# Patient Record
(demographics unavailable — no encounter records)

---

## 2024-11-06 NOTE — HISTORY OF PRESENT ILLNESS
[Never] : never [TextBox_4] : 69-year-old female with history of mild OAD presents for follow-up.  Patient continues to complain of occasional productive cough without fever, chills, chest pain, hemoptysis, night sweats or weight loss.  She stopped use of Trelegy 1 month ago as prescribed by me last visit because of mouth discomfort.  She continues to use albuterol on an as-needed basis alone. [TextBox_29] : Denies snoring, daytime somnolence, apneic episodes, AM headaches

## 2024-11-11 NOTE — ASSESSMENT
[FreeTextEntry1] : 68 y/o F DsDNA ab positivity  =dx w Sjogren in early 2000s.  =SICCA symptoms =only on topicals, as cholinergic agonist contraindicated per cardiologist.  =labs reviewed from 4/24 [facility] 1:160 speckled, ESR 63, DsDNA 28 UA protein 30, RBC 12; urine protein/cr 0.3  No major abnormalities in PFTs (mild findings). Can f/u in 6 months.   Plan  Labs to measure disease activity and monitor for drug toxicities  RTO in 6 months

## 2024-11-11 NOTE — HISTORY OF PRESENT ILLNESS
[___ Month(s) Ago] : [unfilled] month(s) ago [FreeTextEntry1] : =pt was given Montelukast which helps w her productive cough  =no fevers, SOB, CP, abdominal pain, n/v/d, rashes, joint pain, swelling

## 2024-11-11 NOTE — DATA REVIEWED
[FreeTextEntry1] : PFTs 8/24 reviewed: FVC 95, FEV1 89, FEV1/FVC 91, DLCO 93 minimal obstructive lung defect, airway obstruction is confirmed by decrease in flow rate 25%, 50% and 75% of flow volume curve. Diffusion is within normal limits. Interpreted as insignificant response to bronchodilator.

## 2024-11-11 NOTE — PHYSICAL EXAM
[General Appearance - Well Nourished] : well nourished [General Appearance - Well Developed] : well developed [Sclera] : the sclera and conjunctiva were normal [Auscultation Breath Sounds / Voice Sounds] : lungs were clear to auscultation bilaterally [FreeTextEntry1] : grade III murmur, diffuse  [Abdomen Soft] : soft [Abdomen Tenderness] : non-tender [Cervical Lymph Nodes Enlarged Posterior Bilaterally] : posterior cervical [Cervical Lymph Nodes Enlarged Anterior Bilaterally] : anterior cervical [Supraclavicular Lymph Nodes Enlarged Bilaterally] : supraclavicular [Musculoskeletal - Swelling] : no joint swelling seen [] : no rash [Skin Lesions] : no skin lesions [Oriented To Time, Place, And Person] : oriented to person, place, and time

## 2025-04-23 NOTE — DISCUSSION/SUMMARY
[FreeTextEntry1] : 70-year-old female with mild OAD clinically stable.  Patient is continue montelukast daily with albuterol as needed.  PFTs will be repeated in the future.  Pain meds on occasion for her right arm pain.  She is to follow-up with her PMD as before.   Denise, was present throughout the exam.

## 2025-04-23 NOTE — PHYSICAL EXAM
[No Acute Distress] : no acute distress [Normal Oropharynx] : normal oropharynx [Normal Appearance] : normal appearance [No Neck Mass] : no neck mass [Normal Rate/Rhythm] : normal rate/rhythm [Murmur ___ / 6] : murmur [unfilled] / 6 [Normal S1, S2] : normal s1, s2 [No Resp Distress] : no resp distress [Clear to Auscultation Bilaterally] : clear to auscultation bilaterally [No Abnormalities] : no abnormalities [Benign] : benign [Normal Gait] : normal gait [No Clubbing] : no clubbing [No Cyanosis] : no cyanosis [No Edema] : no edema [FROM] : FROM [Normal Color/ Pigmentation] : normal color/ pigmentation [No Focal Deficits] : no focal deficits [Oriented x3] : oriented x3 [Normal Affect] : normal affect [TextBox_80] : Midsternal scar

## 2025-04-23 NOTE — REVIEW OF SYSTEMS
[Cough] : cough [Sputum] : no sputum [Dyspnea] : no dyspnea [Wheezing] : no wheezing [GERD] : gerd [Trauma/ Injury] : trauma/ injury [Negative] : Endocrine

## 2025-04-23 NOTE — HISTORY OF PRESENT ILLNESS
[Never] : never [TextBox_4] : 70-year-old female with history of mild OAD presents for follow-up.  Patient feels "better" with occasional dry cough without fever, chills, chest pain, hemoptysis, night sweats or weight loss.  She continues to use montelukast daily with rare albuterol use.  Patient had a mechanical fall yesterday complaining of right arm pain. [TextBox_29] : Denies snoring, daytime somnolence, apneic episodes, AM headaches

## 2025-05-13 NOTE — DATA REVIEWED
[FreeTextEntry1] : labs reviewed from 11/24 [facility] 11.1 UA mod blood, RBC 6; urine protein/cr 0.3 DsDNA 23

## 2025-05-13 NOTE — ASSESSMENT
[FreeTextEntry1] : 69 y/o F DsDNA ab positivity  =dx w Sjogren in early 2000s.  =SICCA symptoms =only on topicals, as cholinergic agonist contraindicated per cardiologist.  =labs reviewed from 4/24 [facility]:160 speckled, ESR 63, DsDNA 28 UA protein 30, RBC 12; urine protein/cr 0.3 ******************************************************************************* =new L foot pain   Unsure if L foot pain and shin pain related to SLE. Clinical presentation not typical. Can offer hcq....  Plan  Labs to measure disease activity and monitor for drug toxicities  pt benefit from hcq?  RTO in 6 months (pt to transition to other rheumatologist, given leaving practice; referral info given)

## 2025-05-13 NOTE — HISTORY OF PRESENT ILLNESS
[___ Month(s) Ago] : [unfilled] month(s) ago [FreeTextEntry1] : =pt uses artificial tears for eyes; pt uses water intake for dry mouth =pt still has cough on and off  =sometimes has pain in legs that are spontaneous; also has pain in L foot;  =pt when she coughs, feels a ball around left groin =no fevers, SOB, CP, abdominal pain, n/v/d, rashes, joint pain, swelling

## 2025-05-13 NOTE — PHYSICAL EXAM
[General Appearance - Well Nourished] : well nourished [General Appearance - Well Developed] : well developed [Sclera] : the sclera and conjunctiva were normal [Auscultation Breath Sounds / Voice Sounds] : lungs were clear to auscultation bilaterally [Abdomen Soft] : soft [Abdomen Tenderness] : non-tender [Cervical Lymph Nodes Enlarged Posterior Bilaterally] : posterior cervical [Cervical Lymph Nodes Enlarged Anterior Bilaterally] : anterior cervical [Supraclavicular Lymph Nodes Enlarged Bilaterally] : supraclavicular [Musculoskeletal - Swelling] : no joint swelling seen [] : no rash [Skin Lesions] : no skin lesions [Oriented To Time, Place, And Person] : oriented to person, place, and time [FreeTextEntry1] : grade III murmur, diffuse